# Patient Record
Sex: MALE | Race: BLACK OR AFRICAN AMERICAN | Employment: UNEMPLOYED | URBAN - METROPOLITAN AREA
[De-identification: names, ages, dates, MRNs, and addresses within clinical notes are randomized per-mention and may not be internally consistent; named-entity substitution may affect disease eponyms.]

---

## 2018-08-11 ENCOUNTER — HOSPITAL ENCOUNTER (EMERGENCY)
Age: 33
Discharge: HOME | End: 2018-08-11
Attending: EMERGENCY MEDICINE

## 2018-08-11 VITALS
WEIGHT: 308 LBS | HEART RATE: 108 BPM | BODY MASS INDEX: 38.5 KG/M2 | RESPIRATION RATE: 20 BRPM | OXYGEN SATURATION: 98 % | SYSTOLIC BLOOD PRESSURE: 152 MMHG | DIASTOLIC BLOOD PRESSURE: 93 MMHG | TEMPERATURE: 98.2 F

## 2018-08-11 DIAGNOSIS — R21 RASH AND OTHER NONSPECIFIC SKIN ERUPTION: Primary | ICD-10-CM

## 2018-08-11 PROCEDURE — 99282 EMERGENCY DEPT VISIT SF MDM: CPT

## 2018-08-11 RX ORDER — PREDNISONE 20 MG/1
TABLET ORAL
Qty: 18 TABLET | Refills: 0 | Status: SHIPPED | OUTPATIENT
Start: 2018-08-11 | End: 2018-08-21

## 2018-08-11 ASSESSMENT — ENCOUNTER SYMPTOMS
EYE ITCHING: 0
EYE PAIN: 0
EYE REDNESS: 0
EYE DISCHARGE: 0

## 2018-08-11 NOTE — ED PROVIDER NOTES
has been discussed in detail and they are aware of the specific conditions for emergent return, as well as the importance of follow-up. New Prescriptions    PREDNISONE (DELTASONE) 20 MG TABLET    Sig.: Take 60mg  Po qd x 3 days, then 40mg po qd x3 days, then 20mg po qd x 3 days. QS x 9 days       Diagnosis:  1. Rash and other nonspecific skin eruption        Disposition:  Patient's disposition: Discharge to home  Patient's condition is stable.            Dani Reyes DO  08/11/18 9026

## 2018-12-01 ENCOUNTER — APPOINTMENT (OUTPATIENT)
Dept: GENERAL RADIOLOGY | Age: 33
End: 2018-12-01

## 2018-12-01 ENCOUNTER — HOSPITAL ENCOUNTER (EMERGENCY)
Age: 33
Discharge: HOME | End: 2018-12-01
Attending: EMERGENCY MEDICINE

## 2018-12-01 VITALS
HEIGHT: 75 IN | DIASTOLIC BLOOD PRESSURE: 83 MMHG | HEART RATE: 75 BPM | SYSTOLIC BLOOD PRESSURE: 120 MMHG | WEIGHT: 300 LBS | TEMPERATURE: 97.7 F | OXYGEN SATURATION: 97 % | RESPIRATION RATE: 18 BRPM | BODY MASS INDEX: 37.3 KG/M2

## 2018-12-01 DIAGNOSIS — G89.29 CHRONIC PAIN OF RIGHT KNEE: Primary | ICD-10-CM

## 2018-12-01 DIAGNOSIS — M25.561 CHRONIC PAIN OF RIGHT KNEE: Primary | ICD-10-CM

## 2018-12-01 PROCEDURE — 99283 EMERGENCY DEPT VISIT LOW MDM: CPT

## 2018-12-01 PROCEDURE — 73562 X-RAY EXAM OF KNEE 3: CPT

## 2018-12-01 RX ORDER — MELOXICAM 15 MG/1
15 TABLET ORAL DAILY
Qty: 30 TABLET | Refills: 0 | Status: SHIPPED | OUTPATIENT
Start: 2018-12-01 | End: 2020-02-15

## 2018-12-01 ASSESSMENT — PAIN DESCRIPTION - ORIENTATION: ORIENTATION: RIGHT

## 2018-12-01 ASSESSMENT — ENCOUNTER SYMPTOMS
DIARRHEA: 0
NAUSEA: 0
SHORTNESS OF BREATH: 0
COUGH: 0
SORE THROAT: 0
WHEEZING: 0
BACK PAIN: 0
SINUS PRESSURE: 0
EYE REDNESS: 0
EYE DISCHARGE: 0
EYE PAIN: 0
ABDOMINAL PAIN: 0
VOMITING: 0

## 2018-12-01 ASSESSMENT — PAIN DESCRIPTION - DESCRIPTORS: DESCRIPTORS: ACHING;SHARP

## 2018-12-01 ASSESSMENT — PAIN DESCRIPTION - FREQUENCY: FREQUENCY: INTERMITTENT

## 2018-12-01 ASSESSMENT — PAIN DESCRIPTION - LOCATION: LOCATION: KNEE

## 2018-12-01 ASSESSMENT — PAIN SCALES - GENERAL: PAINLEVEL_OUTOF10: 6

## 2018-12-01 ASSESSMENT — PAIN DESCRIPTION - PAIN TYPE: TYPE: CHRONIC PAIN

## 2020-02-15 ENCOUNTER — APPOINTMENT (OUTPATIENT)
Dept: GENERAL RADIOLOGY | Age: 35
End: 2020-02-15
Payer: MEDICAID

## 2020-02-15 ENCOUNTER — HOSPITAL ENCOUNTER (EMERGENCY)
Age: 35
Discharge: HOME | End: 2020-02-15
Attending: EMERGENCY MEDICINE
Payer: MEDICAID

## 2020-02-15 VITALS
BODY MASS INDEX: 34.37 KG/M2 | RESPIRATION RATE: 18 BRPM | HEART RATE: 97 BPM | DIASTOLIC BLOOD PRESSURE: 97 MMHG | WEIGHT: 275 LBS | TEMPERATURE: 98 F | OXYGEN SATURATION: 99 % | SYSTOLIC BLOOD PRESSURE: 143 MMHG

## 2020-02-15 PROCEDURE — G0382 LEV 3 HOSP TYPE B ED VISIT: HCPCS

## 2020-02-15 PROCEDURE — 73630 X-RAY EXAM OF FOOT: CPT

## 2020-02-15 PROCEDURE — 10060 I&D ABSCESS SIMPLE/SINGLE: CPT

## 2020-02-15 PROCEDURE — 2500000003 HC RX 250 WO HCPCS

## 2020-02-15 RX ORDER — LIDOCAINE HYDROCHLORIDE 10 MG/ML
INJECTION, SOLUTION INFILTRATION; PERINEURAL
Status: COMPLETED
Start: 2020-02-15 | End: 2020-02-15

## 2020-02-15 RX ORDER — CEPHALEXIN 500 MG/1
500 CAPSULE ORAL 4 TIMES DAILY
Qty: 40 CAPSULE | Refills: 0 | Status: SHIPPED | OUTPATIENT
Start: 2020-02-15 | End: 2020-02-25

## 2020-02-15 RX ORDER — SULFAMETHOXAZOLE AND TRIMETHOPRIM 800; 160 MG/1; MG/1
1 TABLET ORAL 2 TIMES DAILY
Qty: 20 TABLET | Refills: 0 | Status: SHIPPED | OUTPATIENT
Start: 2020-02-15 | End: 2020-02-25

## 2020-02-15 RX ORDER — TRAMADOL HYDROCHLORIDE 50 MG/1
50 TABLET ORAL EVERY 6 HOURS PRN
Qty: 12 TABLET | Refills: 0 | Status: SHIPPED | OUTPATIENT
Start: 2020-02-15 | End: 2020-02-18

## 2020-02-15 RX ADMIN — LIDOCAINE HYDROCHLORIDE 100 MG: 10 INJECTION, SOLUTION INFILTRATION; PERINEURAL at 16:16

## 2020-02-15 ASSESSMENT — PAIN SCALES - GENERAL
PAINLEVEL_OUTOF10: 6
PAINLEVEL_OUTOF10: 6

## 2020-02-15 ASSESSMENT — PAIN DESCRIPTION - DESCRIPTORS: DESCRIPTORS: SORE

## 2020-02-15 ASSESSMENT — PAIN DESCRIPTION - PAIN TYPE: TYPE: ACUTE PAIN

## 2020-02-15 ASSESSMENT — PAIN DESCRIPTION - PROGRESSION
CLINICAL_PROGRESSION: NOT CHANGED
CLINICAL_PROGRESSION: GRADUALLY WORSENING

## 2020-02-15 ASSESSMENT — PAIN DESCRIPTION - LOCATION: LOCATION: FOOT

## 2020-02-15 ASSESSMENT — PAIN DESCRIPTION - FREQUENCY: FREQUENCY: CONTINUOUS

## 2020-02-15 ASSESSMENT — PAIN DESCRIPTION - ORIENTATION: ORIENTATION: RIGHT

## 2020-02-15 NOTE — ED TRIAGE NOTES
I and D per Dr. Fitzptarick S, [facility] purulent drainage  obtained. Dressed with neosporin and telfa with monie.

## 2020-02-15 NOTE — ED PROVIDER NOTES
extremities x 4. Warm and well perfused, no clubbing, cyanosis, or edema. Capillary refill <3 seconds  Skin: warm and dry. Midpoint of the plantar aspect of the right foot there is tenderness with soft tissue swelling noted. Neurologic: GCS 15, CN 2-12 grossly intact, no focal deficits, symmetric strength 5/5 in the upper and lower extremities bilaterally  Psych: Normal Affect    -------------------------------------------------- RESULTS -------------------------------------------------  I have personally reviewed all laboratory and imaging results for this patient. Results are listed below. LABS:  No results found for this visit on 02/15/20. RADIOLOGY:  Interpreted by Radiologist.  XR FOOT RIGHT (MIN 3 VIEWS)   Final Result   No acute findings. ------------------------- NURSING NOTES AND VITALS REVIEWED ---------------------------   The nursing notes within the ED encounter and vital signs as below have been reviewed by myself. BP (!) 143/97   Pulse 97   Temp 98 °F (36.7 °C)   Resp 18   Wt 275 lb (124.7 kg)   SpO2 99%   BMI 34.37 kg/m²   Oxygen Saturation Interpretation: Normal    The patients available past medical records and past encounters were reviewed. ------------------------------ ED COURSE/MEDICAL DECISION MAKING----------------------  Medications   lidocaine 1 % injection (100 mg  Given 2/15/20 1616)             Medical Decision Making:      PROCEDURE  2/15/20       Time: 3:58 PM    INCISION AND DRAINAGE  Risks, benefits and alternatives (for applicable procedures below) described. Performed By: Vernell Hodges MD.    Indication: Abscess  Informed consent obtained: The patient was counseled regarding the procedure, it's indications, risks, potential complications and alternatives and any questions were answered. Consent was obtained. .  Prep: The skin was cleansed with povidone iodine and draped in a sterile fashion.   Anesthetic: The wound area was anesthetized with

## 2021-01-01 ENCOUNTER — HOSPITAL ENCOUNTER (EMERGENCY)
Age: 36
Discharge: HOME | End: 2021-11-21
Payer: MEDICAID

## 2021-01-01 ENCOUNTER — APPOINTMENT (OUTPATIENT)
Dept: GENERAL RADIOLOGY | Age: 36
End: 2021-01-01
Payer: MEDICAID

## 2021-01-01 ENCOUNTER — CARE COORDINATION (OUTPATIENT)
Dept: CARE COORDINATION | Age: 36
End: 2021-01-01

## 2021-01-01 ENCOUNTER — HOSPITAL ENCOUNTER (EMERGENCY)
Age: 36
End: 2021-11-24
Attending: EMERGENCY MEDICINE
Payer: MEDICAID

## 2021-01-01 VITALS
RESPIRATION RATE: 18 BRPM | HEIGHT: 75 IN | BODY MASS INDEX: 32.95 KG/M2 | SYSTOLIC BLOOD PRESSURE: 128 MMHG | OXYGEN SATURATION: 91 % | TEMPERATURE: 100.3 F | WEIGHT: 265 LBS | HEART RATE: 103 BPM | DIASTOLIC BLOOD PRESSURE: 92 MMHG

## 2021-01-01 DIAGNOSIS — I46.9 CARDIAC ARREST (HCC): Primary | ICD-10-CM

## 2021-01-01 DIAGNOSIS — U07.1 COVID-19: Primary | ICD-10-CM

## 2021-01-01 LAB
ALBUMIN SERPL-MCNC: 4.1 G/DL (ref 3.5–5.2)
ALP BLD-CCNC: 77 U/L (ref 40–129)
ALT SERPL-CCNC: 57 U/L (ref 0–40)
AMORPHOUS: ABNORMAL
ANION GAP SERPL CALCULATED.3IONS-SCNC: 15 MMOL/L (ref 7–16)
AST SERPL-CCNC: 184 U/L (ref 0–39)
BACTERIA: ABNORMAL /HPF
BASOPHILS ABSOLUTE: 0.03 E9/L (ref 0–0.2)
BASOPHILS RELATIVE PERCENT: 0.5 % (ref 0–2)
BILIRUB SERPL-MCNC: 0.7 MG/DL (ref 0–1.2)
BILIRUBIN URINE: ABNORMAL
BLOOD, URINE: ABNORMAL
BUN BLDV-MCNC: 19 MG/DL (ref 6–20)
CALCIUM SERPL-MCNC: 8.4 MG/DL (ref 8.6–10.2)
CHLORIDE BLD-SCNC: 98 MMOL/L (ref 98–107)
CLARITY: CLEAR
CO2: 22 MMOL/L (ref 22–29)
COARSE CASTS, UA: ABNORMAL /LPF (ref 0–2)
COLOR: YELLOW
CREAT SERPL-MCNC: 2.1 MG/DL (ref 0.7–1.2)
EKG ATRIAL RATE: 113 BPM
EKG P AXIS: 36 DEGREES
EKG P-R INTERVAL: 142 MS
EKG Q-T INTERVAL: 306 MS
EKG QRS DURATION: 72 MS
EKG QTC CALCULATION (BAZETT): 419 MS
EKG R AXIS: 26 DEGREES
EKG T AXIS: -9 DEGREES
EKG VENTRICULAR RATE: 113 BPM
EOSINOPHILS ABSOLUTE: 0.21 E9/L (ref 0.05–0.5)
EOSINOPHILS RELATIVE PERCENT: 3.2 % (ref 0–6)
GFR AFRICAN AMERICAN: 44
GFR NON-AFRICAN AMERICAN: 44 ML/MIN/1.73
GLUCOSE BLD-MCNC: 172 MG/DL (ref 74–99)
GLUCOSE URINE: NEGATIVE MG/DL
HCT VFR BLD CALC: 45.3 % (ref 37–54)
HEMOGLOBIN: 15.5 G/DL (ref 12.5–16.5)
IMMATURE GRANULOCYTES #: 0.06 E9/L
IMMATURE GRANULOCYTES %: 0.9 % (ref 0–5)
KETONES, URINE: 15 MG/DL
LEUKOCYTE ESTERASE, URINE: NEGATIVE
LYMPHOCYTES ABSOLUTE: 0.83 E9/L (ref 1.5–4)
LYMPHOCYTES RELATIVE PERCENT: 12.8 % (ref 20–42)
MCH RBC QN AUTO: 30.8 PG (ref 26–35)
MCHC RBC AUTO-ENTMCNC: 34.2 % (ref 32–34.5)
MCV RBC AUTO: 89.9 FL (ref 80–99.9)
MONOCYTES ABSOLUTE: 0.46 E9/L (ref 0.1–0.95)
MONOCYTES RELATIVE PERCENT: 7.1 % (ref 2–12)
NEUTROPHILS ABSOLUTE: 4.88 E9/L (ref 1.8–7.3)
NEUTROPHILS RELATIVE PERCENT: 75.5 % (ref 43–80)
NITRITE, URINE: NEGATIVE
PDW BLD-RTO: 12.7 FL (ref 11.5–15)
PH UA: 6 (ref 5–9)
PLATELET # BLD: 161 E9/L (ref 130–450)
PMV BLD AUTO: 12.1 FL (ref 7–12)
POTASSIUM REFLEX MAGNESIUM: 3.7 MMOL/L (ref 3.5–5)
PROTEIN UA: >=300 MG/DL
RBC # BLD: 5.04 E12/L (ref 3.8–5.8)
RBC UA: ABNORMAL /HPF (ref 0–2)
SARS-COV-2, NAAT: DETECTED
SODIUM BLD-SCNC: 135 MMOL/L (ref 132–146)
SPECIFIC GRAVITY UA: 1.02 (ref 1–1.03)
TOTAL PROTEIN: 7.7 G/DL (ref 6.4–8.3)
TROPONIN, HIGH SENSITIVITY: 11 NG/L (ref 0–11)
UROBILINOGEN, URINE: 0.2 E.U./DL
WBC # BLD: 6.5 E9/L (ref 4.5–11.5)
WBC UA: ABNORMAL /HPF (ref 0–5)

## 2021-01-01 PROCEDURE — 99284 EMERGENCY DEPT VISIT MOD MDM: CPT

## 2021-01-01 PROCEDURE — 85025 COMPLETE CBC W/AUTO DIFF WBC: CPT

## 2021-01-01 PROCEDURE — 6370000000 HC RX 637 (ALT 250 FOR IP): Performed by: PHYSICIAN ASSISTANT

## 2021-01-01 PROCEDURE — 6360000002 HC RX W HCPCS: Performed by: PHYSICIAN ASSISTANT

## 2021-01-01 PROCEDURE — 96375 TX/PRO/DX INJ NEW DRUG ADDON: CPT

## 2021-01-01 PROCEDURE — 93005 ELECTROCARDIOGRAM TRACING: CPT | Performed by: PHYSICIAN ASSISTANT

## 2021-01-01 PROCEDURE — 96374 THER/PROPH/DIAG INJ IV PUSH: CPT

## 2021-01-01 PROCEDURE — 71046 X-RAY EXAM CHEST 2 VIEWS: CPT

## 2021-01-01 PROCEDURE — 80053 COMPREHEN METABOLIC PANEL: CPT

## 2021-01-01 PROCEDURE — 31500 INSERT EMERGENCY AIRWAY: CPT

## 2021-01-01 PROCEDURE — 2580000003 HC RX 258: Performed by: PHYSICIAN ASSISTANT

## 2021-01-01 PROCEDURE — 92950 HEART/LUNG RESUSCITATION CPR: CPT

## 2021-01-01 PROCEDURE — 84484 ASSAY OF TROPONIN QUANT: CPT

## 2021-01-01 PROCEDURE — 81001 URINALYSIS AUTO W/SCOPE: CPT

## 2021-01-01 PROCEDURE — 96361 HYDRATE IV INFUSION ADD-ON: CPT

## 2021-01-01 PROCEDURE — 99283 EMERGENCY DEPT VISIT LOW MDM: CPT

## 2021-01-01 PROCEDURE — 87635 SARS-COV-2 COVID-19 AMP PRB: CPT

## 2021-01-01 RX ORDER — AZITHROMYCIN 250 MG/1
TABLET, FILM COATED ORAL
Qty: 1 PACKET | Refills: 0 | Status: SHIPPED | OUTPATIENT
Start: 2021-01-01 | End: 2021-11-25

## 2021-01-01 RX ORDER — NAPROXEN 500 MG/1
500 TABLET ORAL 2 TIMES DAILY WITH MEALS
Qty: 20 TABLET | Refills: 0 | Status: SHIPPED | OUTPATIENT
Start: 2021-01-01

## 2021-01-01 RX ORDER — ACETAMINOPHEN 325 MG/1
650 TABLET ORAL EVERY 6 HOURS PRN
COMMUNITY

## 2021-01-01 RX ORDER — ONDANSETRON 2 MG/ML
4 INJECTION INTRAMUSCULAR; INTRAVENOUS ONCE
Status: COMPLETED | OUTPATIENT
Start: 2021-01-01 | End: 2021-01-01

## 2021-01-01 RX ORDER — ACETAMINOPHEN 500 MG
1000 TABLET ORAL ONCE
Status: COMPLETED | OUTPATIENT
Start: 2021-01-01 | End: 2021-01-01

## 2021-01-01 RX ORDER — 0.9 % SODIUM CHLORIDE 0.9 %
1000 INTRAVENOUS SOLUTION INTRAVENOUS ONCE
Status: COMPLETED | OUTPATIENT
Start: 2021-01-01 | End: 2021-01-01

## 2021-01-01 RX ORDER — ONDANSETRON 4 MG/1
4 TABLET, ORALLY DISINTEGRATING ORAL 3 TIMES DAILY PRN
Qty: 21 TABLET | Refills: 0 | Status: SHIPPED | OUTPATIENT
Start: 2021-01-01

## 2021-01-01 RX ORDER — DEXAMETHASONE SODIUM PHOSPHATE 10 MG/ML
10 INJECTION INTRAMUSCULAR; INTRAVENOUS ONCE
Status: COMPLETED | OUTPATIENT
Start: 2021-01-01 | End: 2021-01-01

## 2021-01-01 RX ORDER — DEXAMETHASONE 6 MG/1
6 TABLET ORAL 2 TIMES DAILY WITH MEALS
Qty: 20 TABLET | Refills: 0 | Status: SHIPPED | OUTPATIENT
Start: 2021-01-01 | End: 2021-12-01

## 2021-01-01 RX ADMIN — ACETAMINOPHEN 1000 MG: 500 TABLET ORAL at 17:27

## 2021-01-01 RX ADMIN — SODIUM CHLORIDE 1000 ML: 9 INJECTION, SOLUTION INTRAVENOUS at 15:24

## 2021-01-01 RX ADMIN — DEXAMETHASONE SODIUM PHOSPHATE 10 MG: 10 INJECTION INTRAMUSCULAR; INTRAVENOUS at 17:18

## 2021-01-01 RX ADMIN — ONDANSETRON 4 MG: 2 INJECTION INTRAMUSCULAR; INTRAVENOUS at 17:16

## 2021-01-01 ASSESSMENT — PAIN SCALES - GENERAL: PAINLEVEL_OUTOF10: 6

## 2021-01-01 ASSESSMENT — PAIN DESCRIPTION - PAIN TYPE: TYPE: ACUTE PAIN

## 2021-01-01 ASSESSMENT — PAIN DESCRIPTION - ORIENTATION: ORIENTATION: LEFT;RIGHT;INNER;UPPER

## 2021-01-01 ASSESSMENT — PAIN DESCRIPTION - LOCATION: LOCATION: ABDOMEN;LEG

## 2021-01-01 ASSESSMENT — PAIN DESCRIPTION - FREQUENCY: FREQUENCY: CONTINUOUS

## 2021-11-21 NOTE — ED PROVIDER NOTES
[facility]    HPI:  11/21/21, Time: 3:00 PM XENIA Cahmpion is a 29 y.o. male presenting to the ED for concern for COVID, beginning 5 days ago. The complaint has been persistent, moderate in severity, and worsened by nothing. Patient is currently reporting the following symptoms headache, body aches, chills, cough, SOB, diarrhea. Suspected fever but has not checked his temperature at home. Without recent travel or sick contacts. Patient denies all other symptoms at this time. Review of Systems:   A complete review of systems was performed and pertinent positives and negatives are stated within HPI, all other systems reviewed and are negative.          --------------------------------------------- PAST HISTORY ---------------------------------------------  Past Medical History:  has no past medical history on file. Past Surgical History:  has no past surgical history on file. Social History:  reports that he has been smoking cigars. He has never used smokeless tobacco. He reports that he does not drink alcohol and does not use drugs. Family History: family history is not on file. The patients home medications have been reviewed. Allergies: Patient has no known allergies.     -------------------------------------------------- RESULTS -------------------------------------------------  All laboratory and radiology results have been personally reviewed by myself   LABS:  Results for orders placed or performed during the hospital encounter of 11/21/21   COVID-19, Rapid    Specimen: Nasopharyngeal Swab   Result Value Ref Range    SARS-CoV-2, NAAT DETECTED (A) Not Detected   CBC Auto Differential   Result Value Ref Range    WBC 6.5 4.5 - 11.5 E9/L    RBC 5.04 3.80 - 5.80 E12/L    Hemoglobin 15.5 12.5 - 16.5 g/dL    Hematocrit 45.3 37.0 - 54.0 %    MCV 89.9 80.0 - 99.9 fL    MCH 30.8 26.0 - 35.0 pg    MCHC 34.2 32.0 - 34.5 %    RDW 12.7 11.5 - 15.0 fL    Platelets 642 460 - 170 E9/L MPV 12.1 (H) 7.0 - 12.0 fL    Neutrophils % 75.5 43.0 - 80.0 %    Immature Granulocytes % 0.9 0.0 - 5.0 %    Lymphocytes % 12.8 (L) 20.0 - 42.0 %    Monocytes % 7.1 2.0 - 12.0 %    Eosinophils % 3.2 0.0 - 6.0 %    Basophils % 0.5 0.0 - 2.0 %    Neutrophils Absolute 4.88 1.80 - 7.30 E9/L    Immature Granulocytes # 0.06 E9/L    Lymphocytes Absolute 0.83 (L) 1.50 - 4.00 E9/L    Monocytes Absolute 0.46 0.10 - 0.95 E9/L    Eosinophils Absolute 0.21 0.05 - 0.50 E9/L    Basophils Absolute 0.03 0.00 - 0.20 E9/L   Comprehensive Metabolic Panel w/ Reflex to MG   Result Value Ref Range    Sodium 135 132 - 146 mmol/L    Potassium reflex Magnesium 3.7 3.5 - 5.0 mmol/L    Chloride 98 98 - 107 mmol/L    CO2 22 22 - 29 mmol/L    Anion Gap 15 7 - 16 mmol/L    Glucose 172 (H) 74 - 99 mg/dL    BUN 19 6 - 20 mg/dL    CREATININE 2.1 (H) 0.7 - 1.2 mg/dL    GFR Non-African American 44 >=60 mL/min/1.73    GFR African American 44     Calcium 8.4 (L) 8.6 - 10.2 mg/dL    Total Protein 7.7 6.4 - 8.3 g/dL    Albumin 4.1 3.5 - 5.2 g/dL    Total Bilirubin 0.7 0.0 - 1.2 mg/dL    Alkaline Phosphatase 77 40 - 129 U/L    ALT 57 (H) 0 - 40 U/L     (H) 0 - 39 U/L   Troponin   Result Value Ref Range    Troponin, High Sensitivity 11 0 - 11 ng/L   Urinalysis, reflex to microscopic   Result Value Ref Range    Color, UA Yellow Straw/Yellow    Clarity, UA Clear Clear    Glucose, Ur Negative Negative mg/dL    Bilirubin Urine SMALL (A) Negative    Ketones, Urine 15 (A) Negative mg/dL    Specific Gravity, UA 1.025 1.005 - 1.030    Blood, Urine LARGE (A) Negative    pH, UA 6.0 5.0 - 9.0    Protein, UA >=300 (A) Negative mg/dL    Urobilinogen, Urine 0.2 <2.0 E.U./dL    Nitrite, Urine Negative Negative    Leukocyte Esterase, Urine Negative Negative   EKG 12 Lead   Result Value Ref Range    Ventricular Rate 113 BPM    Atrial Rate 113 BPM    P-R Interval 142 ms    QRS Duration 72 ms    Q-T Interval 306 ms    QTc Calculation (Bazett) 419 ms    P Axis 36 degrees    R Axis 26 degrees    T Axis -9 degrees       RADIOLOGY:  Interpreted by Radiologist.  XR CHEST (2 VW)   Final Result   Mild bilateral patchy pneumonia. Suspect atypical or COVID related   pneumonia. Please correlate clinically. ------------------------- NURSING NOTES AND VITALS REVIEWED ---------------------------   The nursing notes within the ED encounter and vital signs as below have been reviewed. BP (!) 148/65   Pulse 103   Temp 97.5 °F (36.4 °C) (Tympanic)   Resp 24   Ht 6' 3\" (1.905 m)   Wt 265 lb (120.2 kg)   SpO2 96%   BMI 33.12 kg/m²   Oxygen Saturation Interpretation: Normal      ---------------------------------------------------PHYSICAL EXAM--------------------------------------      Constitutional/General: Alert and oriented x3, well appearing, non toxic in NAD, obese  Head: Normocephalic and atraumatic  Eyes: PERRL, EOMI  Mouth: Oropharynx clear, handling secretions, no trismus  Neck: Supple, full ROM,   Pulmonary: Lungs clear to auscultation bilaterally, no wheezes, rales, or rhonchi. Not in respiratory distress  Cardiovascular:  Regular rate and rhythm, no murmurs, gallops, or rubs. 2+ distal pulses  Abdomen: Soft, non tender, non distended,   Extremities: Moves all extremities x 4. Warm and well perfused  Skin: warm and dry without rash  Neurologic: GCS 15,  Psych: Normal Affect      ------------------------------ ED COURSE/MEDICAL DECISION MAKING----------------------  Medications   dexamethasone (DECADRON) injection 10 mg (has no administration in time range)   ondansetron (ZOFRAN) injection 4 mg (has no administration in time range)   0.9 % sodium chloride bolus (0 mLs IntraVENous Stopped 11/21/21 1622)         ED COURSE:  ED Course as of 11/21/21 1711   Sun Nov 21, 2021   1632 Reassessed patient. Remains stable and neurovascularly intact. Discussed results. Will PO challenge and monitor.   [MS]      ED Course User Index  [MS] [facility]

## 2021-11-21 NOTE — ED NOTES
Please call [facility] with any updates [phone]     Cate Otero  11/21/21 6556

## 2021-11-21 NOTE — ED NOTES
Bed: 17  Expected date:   Expected time:   Means of arrival:   Comments:  terminal     Danisha Ace  11/21/21 6837

## 2021-11-22 NOTE — CARE COORDINATION
Left a HIPAA compliant message for patient to return call re: ED Follow-up for Covid test positive  Initial Screening COVID-19  CDC Guidelines Reinforced via message  Patient has contact information   Encouraged to activate [facility]  Encouraged to est with PCP

## 2021-11-24 NOTE — ED NOTES
Pt cleaned up and placed in gown. Family in lobby at this time. Dr Deepak Roy to notify family.       Priscilla Andino RN  11/24/21 6195

## 2021-11-24 NOTE — ED NOTES
TOD [id number], no cardiac activity verified by ultrasound by Dr Soco Miguel and Dr Elvia Martin.      Zachariah Plummer, NAIMA  11/24/21 6752

## 2021-11-24 NOTE — ED NOTES
Pulse check asystole on the monitor at this time, CPR resumed.       Alley Angel RN  11/24/21 9473       Alley Angel RN  11/24/21 Nabeel Martinez RN  11/24/21 2939

## 2021-11-24 NOTE — ED NOTES
Radha Riddle from [place] coroners office at bedside to speak with family. Autopsy to be completed, pt will be transported to [place]. Transport to be set up by Radha Riddle.       Priscilla Andino RN  11/24/21 0257

## 2021-11-24 NOTE — ED PROVIDER NOTES
Department of Emergency Medicine   ED  Provider Note  Admit Date/RoomTime: 11/24/2021  9:01 AM  ED Room: 09/09 11/24/21  9:35 AM EST      HISTORY OF PRESENT ILLNESS:  (Nurses Notes Reviewed)    Chief Complaint:   Cardiac Arrest and Positive For Covid-19      Source of history provided by:  relative(s), EMS personnel and past medical records. History/Exam Limitations: acuity of illness. Maris Estrada is a 29 y.o. old male presenting to the emergency department, for cardiac arrest, which occured 40 minute(s) prior to arrival.   He has a history of obesity, COVID pneumonia. History comes from the patient's family as well as the medical record. Patient was seen 3 days ago at this facility with concerns for Covid pneumonia after 5 days of upper respiratory symptoms with cough. At that time he was found to be positive for Covid, and during his emergency department stay at that time the patient was not hypoxic. He was considered stable for discharge to home and was given close return precautions and instructions on use of his home pulse oximeter prior to discharge. Since that time, the patient has been having progressively worsening shortness of breath according to family, with increased cough and work of breathing. The patient did not sleep very well last night secondary to his coughing fits, and this morning it was determined that he needed to return to the hospital for further evaluation. While the ambulance was in transit to  the patient, the family heard a thump upstairs and found him on the ground unresponsive. EMS arrived, began ACLS protocol including chest compressions, and due to lack of airway protection attempted to elevate the patient. Patient was then transported to [address] [place] [address].   In transit the patient was noted to have an initial rhythm of V. fib for which he received a cardioversion, however after his cardioversion the patient was found to be in PEA arrest.  Intraosseous access was placed in the left tibia and epinephrine was administered as well as 1 amp of bicarb in transit. Onset:  sudden. Witnessed: no.    Available History:      Prior Cardiac Disease:   No.     Drug Use/Overdose ? No.     Drowning ? No.     GI Bleeding ? No.     Hypothermia ? No.     Other:   Obesity, COVID. Prehospital Care:  ACLS, intubation, cardioversion, medication administration by EMS. Response to Treatment:  Transient return of pulse: No.                                               Sustained return of pulse:  No.    PAST MEDICAL, SURGICAL, SOCIAL AND FAMILY HISTORY SECTION    Past Medical History:  has no past medical history on file. Past Surgical History:  has no past surgical history on file. Social History:  reports that he has been smoking cigars. He has never used smokeless tobacco. He reports that he does not drink alcohol and does not use drugs. Family History: family history is not on file. The patients home medications have been reviewed. Allergies: Patient has no known allergies. Review of Systems:   Pertinent positives and negatives are stated within HPI, all other systems reviewed and are negative. PHYSICAL EXAM:     Constitutional/General: unresponsive  Head: Normocephalic and atraumatic  Eyes: Fixed and dilated  ENT: Patient intubated on arrival.  While bagging, fluid noted to be returning in ET tube. Tube placement was not able to be confirmed with auscultation. Patient's airway was assessed with glide scope and endotracheal tube from the field was found to be in the esophagus. This tube was removed and the patient was intubated under video laryngoscopy in the emergency department. Neck: Supple, full ROM, no stridor, no meningeal signs  Respiratory: No spontaneous respirations. Equal breath sounds with controlled ventilation after re-intubation. Cardiovascular: Heart sounds are Absent. Pulses are Absent. GI:  Abdomen Soft, Non distended. No pulsatile masses. Musculoskeletal: cool,  no edema  Integument: unremarkable  Neurologic: unresponsive to painful stimuli. Neurological exam limited due to condition. Brainstem reflexes absent    ------------------------------------------------ RESULTS ---------------------------------------------------    LABS  No results found for this visit on 11/24/21. RADIOLOGY    ---------------------------- NURSING NOTES AND VITALS REVIEWED -------------------------   The nursing notes within the ED encounter and vital signs as below have been reviewed. There were no vitals taken for this visit. Oxygen Saturation Interpretation: Abnormal      ------------------------------------------PROGRESS NOTES -------------------------------------------    ED COURSE MEDICATIONS:              Medications - No data to display    Medical Decision Making/ED Course:  ACLS protocols were continued once the patient reached the emergency department. The patient's airway was found to be misplaced into the esophagus from the field, and therefore was replaced without difficulty under video laryngoscopy in the emergency department. Patient was treated with continued chest compressions, epinephrine. Patient was in asystole on arrival and remained in asystole throughout the entirety of his code. During pulse checks, the patient's hemodynamic status was assessed with ultrasound both of the femoral artery as well as for the heart directly. No flow through femoral artery was appreciated and no cardiac activity was appreciated. Time of death was called at 56.   Patient was discussed with family    This patient's ED course included: a personal history and physicial examination, multiple bedside re-evaluations, IV medications, cardiac monitoring, continuous pulse oximetry and complex medical decision making and emergency management    ED Course as of 11/24/21 1026   Wed Nov 24, 2021   [address] [facility] PROVIDER ATTESTATION:     I have personally performed and/or participated in the history, exam, medical decision making, and procedures and agree with all pertinent clinical information unless otherwise noted. I have also reviewed and agree with the past medical, family and social history unless otherwise noted. I have discussed this patient in detail with the resident and provided the instruction and education regarding the evidence-based evaluation and treatment of cardiac arrest/Covid. History: Per EMS patient was recently discharged from this ED with a diagnosis of Covid. Patient was having difficulty breathing over the past several days which worsened this morning. Family was going to call 911 when they heard a thump. They found him on the ground with agonal breathing. EMS arrived. At that time ACLS protocols were started. Patient was shocked for V. Fib. At this time several rounds of epinephrine were given as well as continued compressions. They did attempt to intubate the patient in the field however upon arrival when we checked his airway it was an esophageal intubation. This time we did perform endotracheal intubation with success. When the patient arrived to the ED he was still in asystole. ACLS protocols were continued. Patient was given several rounds of epinephrine. Ultrasound showed no cardiac activity and time of death was called at 0905. My findings: Cortney Denney is a 29 y.o. male whom is in no distress. Physical exam reveals patient to be asystole on the monitor. No spontaneous respirations. No palpable pulses. Patient initially had a lot of gastric secretions in his mouth as well as in the ET tube. This was suctioned and the patient was then intubated via glide scope. Skin was cool to palpation. Pupils were fixed and dilated.     My plan:  ACLS protocol    Electronically signed by Judith Isaac DO on 11/24/21 at 9:38 AM EST       [MS]      ED Course User Index  [MS] Joy Campoverde DO       PROCEDURES:              Intubation Procedure Note    Indication: Respiratory failure and airway compromise    Consent: Unable to be obtained due to the emergent nature of this procedure. Medications Used: None    Procedure: The patient was placed in the appropriate position. Cricoid pressure was utilized. Intubation was performed with video laryngoscopy using a 7.5 cuffed endotracheal tube. The cuff was then inflated and the tube was secured appropriately at a distance of 24 cm to the dental ridge. Initial confirmation of placement included bilateral breath sounds, an end tidal CO2 detector, absence of sounds over the stomach and adequate chest rise. The patient tolerated the procedure well. Complications: None        Counseling:   I have spoken with the spouse, mother and brother regarding the patient's treatment/condition at this time. --------------------------------------- IMPRESSION & DISPOSITION --------------------------------     IMPRESSION:  1. Cardiac arrest (RUSTca 75.)            DISPOSITION:  Disposition: Other Disposition: . Patient condition is stable.        Chano Villagran 44., DO  Resident  [facility], DO  21 1026

## 2021-11-24 NOTE — ED NOTES
[facility] call completed, spoke with Olivier Robertson released Ref number [id number]     Maria Elena Klein RN  11/24/21 3969

## 2021-11-24 NOTE — ED NOTES
Pt family contact information  Mother, Mark Morales [phone]  Significant other, Cathryn Christianson [phone]   Per family pt did not have PCP.       Angie Bonner RN  11/24/21 1013

## 2021-11-24 NOTE — ED NOTES
Pt arrived via EMS, CPR in progress. EMS report states pt from home was found unresponsive by family. ET tube placed by EMS PTA, size 7.5, 26 cm at the lip. Medications given by EMS PTA were 5 EPI, 1Bicarb and 2mg narcan. Per EMS pt is COVID positive.       Romayne Lowes, RN  11/24/21 0963       Romayne Lowes, RN  11/24/21 0997

## 2021-11-24 NOTE — ED NOTES
Call to coroners office at this time. Spoke with [facility], states she will call back with determination.       Navdeep Hall RN  11/24/21 0095

## 2025-06-20 NOTE — ED NOTES
Bed: 09  Expected date:   Expected time:   Means of arrival:   Comments:  Terminal     Pankaj Larios RN  11/24/21 9977 complains of pain/discomfort